# Patient Record
Sex: MALE | Race: WHITE | NOT HISPANIC OR LATINO | URBAN - METROPOLITAN AREA
[De-identification: names, ages, dates, MRNs, and addresses within clinical notes are randomized per-mention and may not be internally consistent; named-entity substitution may affect disease eponyms.]

---

## 2018-03-19 ENCOUNTER — IMPORTED ENCOUNTER (OUTPATIENT)
Dept: URBAN - METROPOLITAN AREA CLINIC 50 | Facility: CLINIC | Age: 73
End: 2018-03-19

## 2018-04-04 ENCOUNTER — IMPORTED ENCOUNTER (OUTPATIENT)
Dept: URBAN - METROPOLITAN AREA CLINIC 50 | Facility: CLINIC | Age: 73
End: 2018-04-04

## 2019-04-02 ENCOUNTER — IMPORTED ENCOUNTER (OUTPATIENT)
Dept: URBAN - METROPOLITAN AREA CLINIC 50 | Facility: CLINIC | Age: 74
End: 2019-04-02

## 2019-06-12 NOTE — PATIENT DISCUSSION
REFRACTIVE ERROR, OU - PATIENT IS A GOOD CANDIDATE FOR DVO LASIK SURGERY OU. ALSO A GOOD RLE MF CANDIDATE.   LEANING TOWARDS RLE

## 2019-06-25 NOTE — PATIENT DISCUSSION
PATIENT UNDERSTANDS THAT SHE MAY STILL NEED GLASSES FOR FINE PRINT AND THAT THE DISTANCE VISION MAY BE COMPROMISED TO GET HER A LITTLE NEAR VISION.

## 2019-06-25 NOTE — PATIENT DISCUSSION
***The patient is interested in refractive surgery. After discussing all options for becoming less dependent on glasses after surgery, the patient has elected near and distance vision with multifocal IOLs . The anticipated visual outcome is satisfactory distance and near (primarily for printed material- the patient may require glasses for some intermediate vision). ***

## 2019-06-25 NOTE — PATIENT DISCUSSION
CONSIDER RLE WITH EDOF/MF TO REDUCE DEPENDENCE ON READING GLASSES. ADVISED OF RINGS AND HALOS THAT COME ALONG WITH IOL DESIGN.

## 2019-06-25 NOTE — PATIENT DISCUSSION
PATIENT UNDERSTANDS ALL HER OPTIONS AND ELECTS MF IOL OU AND UNDERSTANDS SHE MAY BE READING GLASSES FOR NEAR OR LEAN INTO HER COMPUTER.

## 2019-08-15 NOTE — PATIENT DISCUSSION
***This patient had  refractive surgery performed. A  multifocal IOL was placed to achieve a target refraction of  plano (which should provide them with satisfactory  distance and near vision). ***

## 2019-09-25 NOTE — PATIENT DISCUSSION
S/P PCIOL OU (MF) - OD, DOING GREAT. OS, RESIDUAL ASTIGMATISM, VISUALLY BOTHERSOME TO PATIENT. THEREFORE, RTC X 1 MO FOR STABILITY CHECK. IF RX STABLE, SCHEDULE MEASUREMENT FOR LRI VS. LASIK FOR ENH IF PT WISHES.

## 2019-10-30 NOTE — PATIENT DISCUSSION
S/P PCIOL OU - OD GREAT. RESIDUAL STABLE RX OS. TRIAL FRAMED IN OFFICE, HAPPY W/ INCREASED CLARITY &amp; INTERESTED IN [facility]. RTC FOR LASIK MEASUREMENT ON KDS DAY TO CONFIRM LASIK VS. LRI.

## 2019-11-04 NOTE — PATIENT DISCUSSION
New Prescription: Zymaxid (gatifloxacin): drops: 0.5% 1 drop four times a day as directed into left eye 11-

## 2019-11-04 NOTE — PATIENT DISCUSSION
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 I discussed the case with my partner Dr. Eddie Montoya who had long years expertice with polyneuropathy disorders and we agreed to pursue with IVIG. Will check anyway Lumbar spine MRI to make sure no suppermposed severe lumbar spine process. institll 1 gtt q2hr day of surgery &amp; for 2 days then decrease to qid for 5 days .

## 2019-11-04 NOTE — PATIENT DISCUSSION
PT UNDERSTANDS OPTIONS AND DESIRES TO PROCEED WITH LASIK OS TO IMPROVE VA AND REDUCE DEPENDENCY ON GLS/CTL

## 2019-11-04 NOTE — PATIENT DISCUSSION
New Prescription: Pred Forte (prednisolone acetate): drops,suspension: 1% 1 drop four times a day as directed into left eye 11-

## 2019-11-14 NOTE — PATIENT DISCUSSION
RESIDUAL REFRACTIVE ERROR POST PC IOL OS- DISC OPT OF LASIK TO FINE TUNE VISUAL OUTCOME. PT UNDERSTANDS OPTION AND DESIRES TO PROCEED WITH LASIK  TO IMPROVE VA AND REDUCE DEPENDENCY ON GLS/CTLS.

## 2019-11-14 NOTE — PATIENT DISCUSSION
Continue: Pred Forte (prednisolone acetate): drops,suspension: 1% 1 drop four times a day as directed into left eye 11-

## 2019-11-21 NOTE — PATIENT DISCUSSION
Continue: prednisolone acetate (prednisolone acetate): drops,suspension: 1% 1 drop four times a day into left eye

## 2019-11-21 NOTE — PATIENT DISCUSSION
1 WEEK LASIK OS- DOING WELL. CONTINUE PRES FREE ATS Q30MIN. STRESSED IMPORTANCE OF UV PROTECTION. RTC 3 WEEKS.

## 2020-02-04 NOTE — PATIENT DISCUSSION
POC reviewed with pt and spouse for CT scan of head to evaluate infarction. S.S. Consult for rehab facility in [facility]. Pt has left facial droop, slurred speech. Left arm flaccid and briefly lifts left leg off bed. PT and OT in with pt today. Pt remains optimistic and motivated to ambulate.    PT. DESIRES TO PROCEED WITH TREATING ABOVE RX, OS

## 2020-06-17 NOTE — PATIENT DISCUSSION
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 Recommend repeat DFE x 1 mo to monitor retinal integrity.

## 2020-07-14 NOTE — PATIENT DISCUSSION
PVD OD, Stable - Monitor x 1 year. Educated on signs and symptoms of retinal detachment and to RTC STAT if symptomatic.

## 2020-11-09 ENCOUNTER — IMPORTED ENCOUNTER (OUTPATIENT)
Dept: URBAN - METROPOLITAN AREA CLINIC 50 | Facility: CLINIC | Age: 75
End: 2020-11-09

## 2020-11-09 NOTE — PATIENT DISCUSSION
"""D/w patient Zaditor vs Pazeo and patient does not want Pazeo sent in patient would rather just ""

## 2020-12-23 NOTE — PATIENT DISCUSSION
DISCUSSED OPTIONS OF BEING LESS DEPENDENT ON GLASSES: Hydroquinone Pregnancy And Lactation Text: This medication has not been assigned a Pregnancy Risk Category but animal studies failed to show danger with the topical medication. It is unknown if the medication is excreted in breast milk.

## 2021-04-18 ASSESSMENT — VISUAL ACUITY
OS_CC: J1+
OD_SC: 20/30-2
OD_SC: 20/40-
OS_BAT: 20/20
OS_BAT: 20/30
OS_PH: 20/25
OS_SC: 20/25-
OD_CC: J1+@ 16 IN
OS_SC: 20/30-
OD_PH: 20/25
OS_CC: J1+@ 18 IN
OD_OTHER: 20/20. 20/20.
OD_OTHER: 20/40. 20/60.
OD_PH: 20/25
OD_BAT: 20/20
OS_BAT: 20/25
OS_OTHER: 20/25. 20/30.
OS_OTHER: 20/30. 20/50.
OD_CC: J1+
OD_CC: J1+@ 18 IN
OD_BAT: 20/30
OS_OTHER: 20/20. 20/20.
OD_SC: 20/40-1
OD_BAT: 20/40
OD_OTHER: 20/30. 20/40.
OS_SC: 20/30+2
OS_CC: J1+@ 16 IN
OD_PH: 20/25

## 2021-04-18 ASSESSMENT — TONOMETRY
OS_IOP_MMHG: 16
OD_IOP_MMHG: 15
OS_IOP_MMHG: 15
OS_IOP_MMHG: 14
OD_IOP_MMHG: 17
OD_IOP_MMHG: 15

## 2021-11-09 ENCOUNTER — PREPPED CHART (OUTPATIENT)
Dept: URBAN - METROPOLITAN AREA CLINIC 49 | Facility: CLINIC | Age: 76
End: 2021-11-09

## 2021-11-15 ENCOUNTER — COMPREHENSIVE EXAM (OUTPATIENT)
Dept: URBAN - METROPOLITAN AREA CLINIC 49 | Facility: CLINIC | Age: 76
End: 2021-11-15

## 2021-11-15 DIAGNOSIS — D23.112: ICD-10-CM

## 2021-11-15 DIAGNOSIS — D23.111: ICD-10-CM

## 2021-11-15 DIAGNOSIS — H35.373: ICD-10-CM

## 2021-11-15 DIAGNOSIS — H25.13: ICD-10-CM

## 2021-11-15 DIAGNOSIS — D23.122: ICD-10-CM

## 2021-11-15 DIAGNOSIS — D23.121: ICD-10-CM

## 2021-11-15 DIAGNOSIS — H35.361: ICD-10-CM

## 2021-11-15 PROCEDURE — 92014 COMPRE OPH EXAM EST PT 1/>: CPT

## 2021-11-15 PROCEDURE — 92134 CPTRZ OPH DX IMG PST SGM RTA: CPT

## 2021-11-15 ASSESSMENT — VISUAL ACUITY
OD_PH: 20/20
OS_CC: J1
OU_SC: 20/25
OU_CC: J1+
OD_SC: 20/30
OD_CC: J1
OS_SC: 20/25
OD_GLARE: 20/40
OS_GLARE: 20/30

## 2021-11-15 ASSESSMENT — TONOMETRY
OD_IOP_MMHG: 16
OS_IOP_MMHG: 16

## 2021-11-15 NOTE — PATIENT DISCUSSION
Patient states lesion bilateral upper lids are bothersome and get irritated. Can excise in office as patient desires. Patient only takes BASA PRN no need to stop.

## 2021-11-15 NOTE — PATIENT DISCUSSION
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 D/w patient what causes them and how we would excise them. Discussed the risk of the excision secondary to location of LLL and NAOMIE lesions. Patient denies wanting to move forward with excision at this time.

## 2023-10-09 ENCOUNTER — COMPREHENSIVE EXAM (OUTPATIENT)
Dept: URBAN - METROPOLITAN AREA CLINIC 49 | Facility: LOCATION | Age: 78
End: 2023-10-09

## 2023-10-09 DIAGNOSIS — H02.831: ICD-10-CM

## 2023-10-09 DIAGNOSIS — H35.373: ICD-10-CM

## 2023-10-09 DIAGNOSIS — D23.111: ICD-10-CM

## 2023-10-09 DIAGNOSIS — H25.13: ICD-10-CM

## 2023-10-09 DIAGNOSIS — D23.122: ICD-10-CM

## 2023-10-09 DIAGNOSIS — H02.834: ICD-10-CM

## 2023-10-09 DIAGNOSIS — H43.813: ICD-10-CM

## 2023-10-09 DIAGNOSIS — H04.123: ICD-10-CM

## 2023-10-09 DIAGNOSIS — D23.112: ICD-10-CM

## 2023-10-09 DIAGNOSIS — D23.121: ICD-10-CM

## 2023-10-09 PROCEDURE — 92134 CPTRZ OPH DX IMG PST SGM RTA: CPT

## 2023-10-09 PROCEDURE — 92014 COMPRE OPH EXAM EST PT 1/>: CPT

## 2023-10-09 ASSESSMENT — VISUAL ACUITY
OD_GLARE: 20/25
OD_PH: 20/25-1/+2
OU_CC: J1 @ 15IN
OD_SC: 20/70-1/+1
OS_GLARE: 20/25-1
OS_PH: 20/20
OS_GLARE: 20/25-1
OS_SC: 20/30-1/+2
OD_GLARE: 20/25

## 2023-10-09 ASSESSMENT — TONOMETRY
OS_IOP_MMHG: 16
OD_IOP_MMHG: 16

## 2023-10-11 ENCOUNTER — CLINIC PROCEDURE ONLY (OUTPATIENT)
Dept: URBAN - METROPOLITAN AREA CLINIC 49 | Facility: LOCATION | Age: 78
End: 2023-10-11

## 2023-10-11 DIAGNOSIS — D23.112: ICD-10-CM

## 2023-10-11 PROCEDURE — 67840 REMOVE EYELID LESION: CPT

## 2023-10-11 ASSESSMENT — VISUAL ACUITY
OD_PH: 20/25
OD_SC: 20/50
OS_SC: 20/30-1
OS_PH: 20/25
OU_SC: 20/40+2

## 2023-10-11 ASSESSMENT — TONOMETRY
OD_IOP_MMHG: 17
OS_IOP_MMHG: 17

## 2023-10-23 ENCOUNTER — FOLLOW UP (OUTPATIENT)
Dept: URBAN - METROPOLITAN AREA CLINIC 49 | Facility: LOCATION | Age: 78
End: 2023-10-23

## 2023-10-23 VITALS — SYSTOLIC BLOOD PRESSURE: 121 MMHG | HEIGHT: 60 IN | DIASTOLIC BLOOD PRESSURE: 90 MMHG | HEART RATE: 65 BPM

## 2023-10-23 DIAGNOSIS — Z98.890: ICD-10-CM

## 2023-10-23 DIAGNOSIS — D23.122: ICD-10-CM

## 2023-10-23 DIAGNOSIS — D23.112: ICD-10-CM

## 2023-10-23 PROCEDURE — 67840 REMOVE EYELID LESION: CPT

## 2023-10-23 PROCEDURE — 92285 EXTERNAL OCULAR PHOTOGRAPHY: CPT

## 2023-10-23 ASSESSMENT — TONOMETRY
OS_IOP_MMHG: 14
OD_IOP_MMHG: 17

## 2023-10-23 ASSESSMENT — VISUAL ACUITY
OD_SC: 20/25
OS_SC: 20/20

## 2023-11-06 ENCOUNTER — FOLLOW UP (OUTPATIENT)
Dept: URBAN - METROPOLITAN AREA CLINIC 49 | Facility: LOCATION | Age: 78
End: 2023-11-06

## 2023-11-06 DIAGNOSIS — D23.111: ICD-10-CM

## 2023-11-06 PROCEDURE — 92012 INTRM OPH EXAM EST PATIENT: CPT

## 2023-11-06 ASSESSMENT — VISUAL ACUITY
OS_SC: 20/25
OD_SC: 20/25-2

## 2023-11-06 ASSESSMENT — TONOMETRY
OD_IOP_MMHG: 14
OS_IOP_MMHG: 14

## 2025-06-09 NOTE — PATIENT DISCUSSION
General: Initiate Treatment: Start SPF 30+ sunscreens containing zinc oxide every 90 to 120 minutes Detail Level: Generalized